# Patient Record
Sex: FEMALE
[De-identification: names, ages, dates, MRNs, and addresses within clinical notes are randomized per-mention and may not be internally consistent; named-entity substitution may affect disease eponyms.]

---

## 2020-09-29 ENCOUNTER — FORM ENCOUNTER (OUTPATIENT)
Age: 55
End: 2020-09-29

## 2021-05-20 ENCOUNTER — RESULT REVIEW (OUTPATIENT)
Age: 56
End: 2021-05-20

## 2021-06-04 ENCOUNTER — RESULT REVIEW (OUTPATIENT)
Age: 56
End: 2021-06-04

## 2021-10-25 PROBLEM — Z00.00 ENCOUNTER FOR PREVENTIVE HEALTH EXAMINATION: Status: ACTIVE | Noted: 2021-10-25

## 2021-11-18 ENCOUNTER — NON-APPOINTMENT (OUTPATIENT)
Age: 56
End: 2021-11-18

## 2021-11-18 ENCOUNTER — APPOINTMENT (OUTPATIENT)
Dept: BREAST CENTER | Facility: CLINIC | Age: 56
End: 2021-11-18
Payer: COMMERCIAL

## 2021-11-18 VITALS
SYSTOLIC BLOOD PRESSURE: 86 MMHG | HEIGHT: 62 IN | DIASTOLIC BLOOD PRESSURE: 64 MMHG | HEART RATE: 70 BPM | WEIGHT: 120 LBS | BODY MASS INDEX: 22.08 KG/M2 | OXYGEN SATURATION: 99 %

## 2021-11-18 DIAGNOSIS — Z80.3 FAMILY HISTORY OF MALIGNANT NEOPLASM OF BREAST: ICD-10-CM

## 2021-11-18 DIAGNOSIS — Z80.8 FAMILY HISTORY OF MALIGNANT NEOPLASM OF OTHER ORGANS OR SYSTEMS: ICD-10-CM

## 2021-11-18 DIAGNOSIS — Z85.3 PERSONAL HISTORY OF MALIGNANT NEOPLASM OF BREAST: ICD-10-CM

## 2021-11-18 DIAGNOSIS — D05.11 INTRADUCTAL CARCINOMA IN SITU OF RIGHT BREAST: ICD-10-CM

## 2021-11-18 DIAGNOSIS — Z78.9 OTHER SPECIFIED HEALTH STATUS: ICD-10-CM

## 2021-11-18 DIAGNOSIS — Z80.42 FAMILY HISTORY OF MALIGNANT NEOPLASM OF PROSTATE: ICD-10-CM

## 2021-11-18 PROCEDURE — 99213 OFFICE O/P EST LOW 20 MIN: CPT

## 2021-11-18 NOTE — PAST MEDICAL HISTORY
[Menarche Age ____] : age at menarche was [unfilled] [History of Hormone Replacement Treatment] : has a history of hormone replacement treatment [Total Preg ___] : G[unfilled] [Live Births ___] : P[unfilled]  [Age At Live Birth ___] : Age at live birth: [unfilled]

## 2021-11-18 NOTE — PHYSICAL EXAM
[Normocephalic] : normocephalic [Atraumatic] : atraumatic [EOMI] : extra ocular movement intact [Supple] : supple [No Supraclavicular Adenopathy] : no supraclavicular adenopathy [No Cervical Adenopathy] : no cervical adenopathy [Examined in the supine and seated position] : examined in the supine and seated position [No dominant masses] : no dominant masses in right breast  [No dominant masses] : no dominant masses left breast [Breast Mass Right Breast ___cm] : no masses [Breast Mass Left Breast ___cm] : no masses [No Axillary Lymphadenopathy] : no left axillary lymphadenopathy [No Edema] : no edema [No Rashes] : no rashes [No Ulceration] : no ulceration [de-identified] : On exam, the patient has obvious bilateral total mastectomies through a Marcum reduction pattern approach with bilateral direct to implant reconstructions and had nipple reconstructions but no tattooing.  On palpation, I cannot feel any suspicious densities over either implant.  She has no axillary, supraclavicular, or cervical adenopathy.  The patient has good sensation on the superior and medial poles but poor sensation on the lateral poles and no sensation on the inferior poles of either breast. [de-identified] : Status post total mastectomy with direct to implant reconstruction through a Wise reduction pattern and nipple tattooing with no evidence of recurrence [de-identified] : Status post total mastectomy with direct to implant reconstruction through a Wise reduction pattern and nipple tattooing with no suspicious findings

## 2021-11-18 NOTE — HISTORY OF PRESENT ILLNESS
[FreeTextEntry1] : The patient is a 56-year-old  postmenopausal white female of Ashkenazi Anglican descent.  She underwent menarche at age 48 and had her first child at age 29.  She underwent menopause at age 48 and never took any hormone replacement therapy.  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  She has no family history of ovarian cancer but has a paternal great aunt who had breast cancer in her 80s.  She underwent BRCA testing which was negative for the full comprehensive panel and later underwent [employer] genetic panel testing which was negative in 2016.  She decided to undergo bilateral mastectomies and had bilateral total mastectomies through a Wise reduction pattern and direct implant reconstructions on 2013 with reconstruction by Dr. Cervantes.  Final pathology just showed some residual high-grade DCIS and she had 3 negative sentinel lymph nodes and 1 negative nonsentinel lymph node.  The left breast was prophylactic and benign.  She later had nipple reconstructions but never had nipple tattooing.  She comes in for routine follow-up and never required any adjuvant therapy.

## 2021-11-18 NOTE — ASSESSMENT
[FreeTextEntry1] : The patient is a 56-year-old  postmenopausal white female of Ashkenazi Rastafarian descent.  She underwent menarche at age 48 and had her first child at age 29.  She underwent menopause at age 48 and never took any hormone replacement therapy.  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  She has no family history of ovarian cancer but has a paternal great aunt who had breast cancer in her 80s.  She underwent BRCA testing which was negative for the full comprehensive panel and later underwent [employer] genetic panel testing which was negative in 2016.  She decided to undergo bilateral mastectomies and had bilateral total mastectomies through a Wise reduction pattern and direct implant reconstructions on 2013 with reconstruction by Dr. Cervantes.  Final pathology just showed some residual high-grade DCIS and she had 3 negative sentinel lymph nodes and 1 negative nonsentinel lymph node.  The left breast was prophylactic and benign.  She later had nipple reconstructions but never had nipple tattooing.  On exam today I cannot feel any suspicious densities over either implant.  She has some animation but an excellent result.  The patient was reassured and should continue yearly follow-up and I will see her again in 2022.

## 2021-11-18 NOTE — REASON FOR VISIT
[Follow-Up: _____] : a [unfilled] follow-up visit [FreeTextEntry1] : The patient comes in with a family history of breast cancer and Ashkenazi [employer] heritage.  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  She had nipple reconstructions but no tattooing and comes in for routine yearly follow-up.

## 2022-11-15 NOTE — PHYSICAL EXAM
[Normocephalic] : normocephalic [Atraumatic] : atraumatic [EOMI] : extra ocular movement intact [Supple] : supple [No Supraclavicular Adenopathy] : no supraclavicular adenopathy [No Cervical Adenopathy] : no cervical adenopathy [Examined in the supine and seated position] : examined in the supine and seated position [No dominant masses] : no dominant masses in right breast  [No dominant masses] : no dominant masses left breast [Breast Mass Right Breast ___cm] : no masses [Breast Mass Left Breast ___cm] : no masses [No Axillary Lymphadenopathy] : no left axillary lymphadenopathy [No Edema] : no edema [No Rashes] : no rashes [No Ulceration] : no ulceration [de-identified] : On exam, the patient has obvious bilateral total mastectomies through a Marcum reduction pattern approach with bilateral direct to implant reconstructions and had nipple reconstructions but no tattooing.  On palpation, I cannot feel any suspicious densities over either implant.  She has no axillary, supraclavicular, or cervical adenopathy.  The patient has good sensation on the superior and medial poles but poor sensation on the lateral poles and no sensation on the inferior poles of either breast. [de-identified] : Status post total mastectomy with direct to implant reconstruction through a Wise reduction pattern and nipple tattooing with no evidence of recurrence [de-identified] : Status post total mastectomy with direct to implant reconstruction through a Wise reduction pattern and nipple tattooing with no suspicious findings

## 2022-11-15 NOTE — HISTORY OF PRESENT ILLNESS
[FreeTextEntry1] : The patient is a 57-year-old  postmenopausal white female of Ashkenazi Samaritan descent.  She underwent menarche at age 12 and had her first child at age 29.  She underwent menopause at age 48 and never took any hormone replacement therapy.  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  She has no family history of ovarian cancer but has a paternal great aunt who had breast cancer in her 80s.  She underwent BRCA testing which was negative for the full comprehensive panel and later underwent [employer] genetic panel testing which was negative in 2016.  She decided to undergo bilateral mastectomies and had bilateral total mastectomies through a Wise reduction pattern and direct implant reconstructions on 2013 with reconstruction by Dr. Cervantes.  Final pathology just showed some residual high-grade DCIS and she had 3 negative sentinel lymph nodes and 1 negative nonsentinel lymph node.  The left breast was prophylactic and benign.  She later had nipple reconstructions but never had nipple tattooing.  She comes in for routine follow-up and never required any adjuvant therapy.

## 2022-11-15 NOTE — ASSESSMENT
[FreeTextEntry1] : The patient is a 57-year-old  postmenopausal white female of Ashkenazi Religious descent.  She underwent menarche at age 12 and had her first child at age 29.  She underwent menopause at age 48 and never took any hormone replacement therapy.  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  She has no family history of ovarian cancer but has a paternal great aunt who had breast cancer in her 80s.  She underwent BRCA testing which was negative for the full comprehensive panel and later underwent [employer] genetic panel testing which was negative in 2016.  She decided to undergo bilateral mastectomies and had bilateral total mastectomies through a Wise reduction pattern and direct implant reconstructions on 2013 with reconstruction by Dr. Cervantes.  Final pathology just showed some residual high-grade DCIS and she had 3 negative sentinel lymph nodes and 1 negative nonsentinel lymph node.  The left breast was prophylactic and benign.  She later had nipple reconstructions but never had nipple tattooing.  On exam today I cannot feel any suspicious densities over either implant.  She has some animation but an excellent result.  The patient was reassured and should continue yearly follow-up and I will see her again in 2023.

## 2022-11-15 NOTE — REASON FOR VISIT
[Follow-Up: _____] : a [unfilled] follow-up visit [FreeTextEntry1] : The patient comes in with a family history of breast cancer and Ashkenazi [employer] heritage.  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  She had nipple reconstructions but no tattooing and comes in for routine yearly follow-up.

## 2022-11-22 ENCOUNTER — APPOINTMENT (OUTPATIENT)
Dept: BREAST CENTER | Facility: CLINIC | Age: 57
End: 2022-11-22

## 2022-11-22 VITALS
WEIGHT: 120 LBS | HEIGHT: 62 IN | SYSTOLIC BLOOD PRESSURE: 110 MMHG | HEART RATE: 65 BPM | BODY MASS INDEX: 22.08 KG/M2 | OXYGEN SATURATION: 98 % | DIASTOLIC BLOOD PRESSURE: 67 MMHG

## 2022-11-22 PROCEDURE — 99213 OFFICE O/P EST LOW 20 MIN: CPT

## 2022-11-22 NOTE — HISTORY OF PRESENT ILLNESS
[FreeTextEntry1] : The patient is a 57-year-old  postmenopausal white female of Ashkenazi Anabaptist descent.  She underwent menarche at age 12 and had her first child at age 29.  She underwent menopause at age 48 and never took any hormone replacement therapy.  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  She has no family history of ovarian cancer but has a paternal great aunt who had breast cancer in her 80s.  She underwent BRCA testing which was negative for the full comprehensive panel and later underwent [employer] genetic panel testing which was negative in 2016.  She decided to undergo bilateral mastectomies and had bilateral total mastectomies through a Wise reduction pattern and direct implant reconstructions on 2013 with reconstruction by Dr. Cervantes.  Final pathology just showed some residual high-grade DCIS and she had 3 negative sentinel lymph nodes and 1 negative nonsentinel lymph node.  The left breast was prophylactic and benign.  She later had nipple reconstructions but never had nipple tattooing.  She comes in for routine follow-up and never required any adjuvant therapy.

## 2022-11-22 NOTE — ASSESSMENT
[FreeTextEntry1] : The patient is a 57-year-old  postmenopausal white female of Ashkenazi Zoroastrian descent.  She underwent menarche at age 12 and had her first child at age 29.  She underwent menopause at age 48 and never took any hormone replacement therapy.  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  She has no family history of ovarian cancer but has a paternal great aunt who had breast cancer in her 80s.  She underwent BRCA testing which was negative for the full comprehensive panel and later underwent [employer] genetic panel testing which was negative in 2016.  She decided to undergo bilateral mastectomies and had bilateral total mastectomies through a Wise reduction pattern and direct to implant reconstructions on 2013 with reconstruction by Dr. Cervantes.  Final pathology just showed some residual high-grade DCIS and she had 3 negative sentinel lymph nodes and 1 negative nonsentinel lymph node.  The left breast was prophylactic and benign.  She later had nipple reconstructions but never had nipple tattooing.  On exam today I cannot feel any suspicious densities over either implant.  She has some animation but an excellent result.  The patient was reassured and should continue yearly follow-up and I will see her again in 2023.

## 2022-11-22 NOTE — PHYSICAL EXAM
[Normocephalic] : normocephalic [Atraumatic] : atraumatic [EOMI] : extra ocular movement intact [Supple] : supple [No Supraclavicular Adenopathy] : no supraclavicular adenopathy [No Cervical Adenopathy] : no cervical adenopathy [Examined in the supine and seated position] : examined in the supine and seated position [No dominant masses] : no dominant masses in right breast  [No dominant masses] : no dominant masses left breast [Breast Mass Right Breast ___cm] : no masses [Breast Mass Left Breast ___cm] : no masses [No Axillary Lymphadenopathy] : no left axillary lymphadenopathy [No Edema] : no edema [No Rashes] : no rashes [No Ulceration] : no ulceration [de-identified] : On exam, the patient has obvious bilateral total mastectomies through a Marcum reduction pattern approach with bilateral direct to implant reconstructions and had nipple reconstructions but no tattooing.  On palpation, I cannot feel any suspicious densities over either implant.  She has no axillary, supraclavicular, or cervical adenopathy.  The patient has good sensation on the superior and medial poles but poor sensation on the lateral poles and no sensation on the inferior poles of either breast. [de-identified] : Status post total mastectomy with direct to implant reconstruction through a Wise reduction pattern and nipple tattooing with no evidence of recurrence [de-identified] : Status post total mastectomy with direct to implant reconstruction through a Wise reduction pattern and nipple tattooing with no suspicious findings

## 2022-11-22 NOTE — REASON FOR VISIT
[Follow-Up: _____] : a [unfilled] follow-up visit [FreeTextEntry1] : The patient comes in with a family history of breast cancer and Ashkenazi Orthodox heritage.  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  She had nipple reconstructions but no tattooing and comes in for routine yearly follow-up.

## 2023-10-20 ENCOUNTER — TRANSCRIPTION ENCOUNTER (OUTPATIENT)
Age: 58
End: 2023-10-20

## 2023-11-16 ENCOUNTER — NON-APPOINTMENT (OUTPATIENT)
Age: 58
End: 2023-11-16

## 2023-11-28 ENCOUNTER — APPOINTMENT (OUTPATIENT)
Dept: BREAST CENTER | Facility: CLINIC | Age: 58
End: 2023-11-28
Payer: COMMERCIAL

## 2023-11-28 ENCOUNTER — APPOINTMENT (OUTPATIENT)
Dept: BREAST CENTER | Facility: CLINIC | Age: 58
End: 2023-11-28

## 2023-11-28 VITALS — DIASTOLIC BLOOD PRESSURE: 58 MMHG | HEART RATE: 65 BPM | HEIGHT: 62 IN | SYSTOLIC BLOOD PRESSURE: 93 MMHG

## 2023-11-28 DIAGNOSIS — Z85.3 PERSONAL HISTORY OF MALIGNANT NEOPLASM OF BREAST: ICD-10-CM

## 2023-11-28 DIAGNOSIS — Z90.13 ACQUIRED ABSENCE OF BILATERAL BREASTS AND NIPPLES: ICD-10-CM

## 2023-11-28 DIAGNOSIS — Z80.3 FAMILY HISTORY OF MALIGNANT NEOPLASM OF BREAST: ICD-10-CM

## 2023-11-28 PROCEDURE — 99213 OFFICE O/P EST LOW 20 MIN: CPT

## 2024-11-08 ENCOUNTER — NON-APPOINTMENT (OUTPATIENT)
Age: 59
End: 2024-11-08

## 2025-01-08 ENCOUNTER — APPOINTMENT (OUTPATIENT)
Dept: BREAST CENTER | Facility: CLINIC | Age: 60
End: 2025-01-08
Payer: COMMERCIAL

## 2025-01-08 VITALS
OXYGEN SATURATION: 100 % | BODY MASS INDEX: 21.71 KG/M2 | WEIGHT: 118 LBS | HEART RATE: 53 BPM | SYSTOLIC BLOOD PRESSURE: 103 MMHG | HEIGHT: 62 IN | DIASTOLIC BLOOD PRESSURE: 66 MMHG

## 2025-01-08 DIAGNOSIS — Z80.3 FAMILY HISTORY OF MALIGNANT NEOPLASM OF BREAST: ICD-10-CM

## 2025-01-08 DIAGNOSIS — Z12.39 ENCOUNTER FOR OTHER SCREENING FOR MALIGNANT NEOPLASM OF BREAST: ICD-10-CM

## 2025-01-08 DIAGNOSIS — Z90.13 ACQUIRED ABSENCE OF BILATERAL BREASTS AND NIPPLES: ICD-10-CM

## 2025-01-08 DIAGNOSIS — Z85.3 PERSONAL HISTORY OF MALIGNANT NEOPLASM OF BREAST: ICD-10-CM

## 2025-01-08 PROCEDURE — 99213 OFFICE O/P EST LOW 20 MIN: CPT
